# Patient Record
Sex: FEMALE | Race: WHITE | NOT HISPANIC OR LATINO | URBAN - METROPOLITAN AREA
[De-identification: names, ages, dates, MRNs, and addresses within clinical notes are randomized per-mention and may not be internally consistent; named-entity substitution may affect disease eponyms.]

---

## 2018-01-02 NOTE — PATIENT DISCUSSION
Astigmatism Counseling: The diagnosis of astigmatism was explained to the patient. Options for the correction of the patient's astigmatism which may include glasses, contacts or elective refractive surgery were discussed. Return for follow-up as scheduled.

## 2021-12-13 ENCOUNTER — NEW PATIENT (OUTPATIENT)
Dept: URBAN - METROPOLITAN AREA CLINIC 17 | Facility: CLINIC | Age: 66
End: 2021-12-13

## 2021-12-13 DIAGNOSIS — H25.13: ICD-10-CM

## 2021-12-13 PROCEDURE — 92004 COMPRE OPH EXAM NEW PT 1/>: CPT

## 2021-12-13 PROCEDURE — 92015 DETERMINE REFRACTIVE STATE: CPT

## 2021-12-13 ASSESSMENT — TONOMETRY
OD_IOP_MMHG: 17
OS_IOP_MMHG: 17

## 2021-12-13 ASSESSMENT — VISUAL ACUITY
OD_CC: 20/30+1
OS_CC: 20/25

## 2022-07-06 RX ORDER — DULOXETIN HYDROCHLORIDE 30 MG/1
1 CAPSULE, DELAYED RELEASE ORAL
COMMUNITY
End: 2022-08-09

## 2022-07-06 RX ORDER — PRAVASTATIN SODIUM 40 MG
TABLET ORAL
COMMUNITY

## 2022-07-06 RX ORDER — FLUTICASONE PROPIONATE 50 MCG
SPRAY, SUSPENSION (ML) NASAL
COMMUNITY

## 2022-07-06 RX ORDER — LEVOTHYROXINE SODIUM 88 UG/1
TABLET ORAL
COMMUNITY

## 2022-08-16 PROBLEM — E03.9 HYPOTHYROID: Status: ACTIVE | Noted: 2022-08-16

## 2022-08-16 PROBLEM — E55.9 VITAMIN D DEFICIENCY: Status: ACTIVE | Noted: 2022-08-16

## 2022-08-16 PROBLEM — I05.9 MITRAL VALVE DISEASE: Status: ACTIVE | Noted: 2017-03-28

## 2022-08-16 PROBLEM — G25.81 RESTLESS LEGS SYNDROME: Status: ACTIVE | Noted: 2022-08-16

## 2022-08-16 PROBLEM — M81.0 POSTMENOPAUSAL BONE LOSS: Status: ACTIVE | Noted: 2022-08-16

## 2022-08-16 PROBLEM — E03.9 HYPOTHYROIDISM: Status: ACTIVE | Noted: 2022-08-16

## 2022-08-16 PROBLEM — E78.2 MIXED HYPERLIPIDEMIA: Status: ACTIVE | Noted: 2022-08-16

## 2022-08-16 PROBLEM — E78.5 HYPERLIPIDEMIA: Status: ACTIVE | Noted: 2022-08-16

## 2022-08-16 PROBLEM — G47.9 SLEEP DISORDER: Status: ACTIVE | Noted: 2022-08-16

## 2022-08-16 PROBLEM — F33.0 MILD EPISODE OF RECURRENT MAJOR DEPRESSIVE DISORDER (HCC): Status: ACTIVE | Noted: 2022-08-16

## 2022-08-16 PROBLEM — R20.2 TINGLING OF BOTH FEET: Status: ACTIVE | Noted: 2022-08-16

## 2022-08-16 PROBLEM — M21.969 KNEE DEFORMITY, ACQUIRED: Status: ACTIVE | Noted: 2022-08-16

## 2022-08-16 PROBLEM — L23.7 CONTACT DERMATITIS DUE TO POISON IVY: Status: ACTIVE | Noted: 2022-08-16

## 2022-08-16 PROBLEM — G47.33 MILD OBSTRUCTIVE SLEEP APNEA: Status: ACTIVE | Noted: 2022-08-16

## 2022-08-16 PROBLEM — J30.89 NON-SEASONAL ALLERGIC RHINITIS: Status: ACTIVE | Noted: 2022-08-16

## 2022-08-16 PROBLEM — F32.9 MAJOR DEPRESSIVE DISORDER, SINGLE EPISODE, UNSPECIFIED: Status: ACTIVE | Noted: 2022-08-16

## 2022-09-08 PROBLEM — D64.9 ANEMIA: Status: ACTIVE | Noted: 2022-09-08

## 2022-09-08 PROBLEM — Z12.31 BREAST CANCER SCREENING BY MAMMOGRAM: Status: ACTIVE | Noted: 2022-09-08

## 2022-09-08 PROBLEM — M54.50 LOW BACK PAIN: Status: ACTIVE | Noted: 2022-09-08

## 2022-09-08 PROBLEM — R82.71 BACTERIURIA: Status: ACTIVE | Noted: 2022-09-08

## 2022-09-08 PROBLEM — D64.9 ANEMIA: Status: RESOLVED | Noted: 2022-09-08 | Resolved: 2022-09-08

## 2022-09-08 PROBLEM — R21 RASH OF BACK: Status: ACTIVE | Noted: 2022-09-08

## 2022-09-08 PROBLEM — B00.9 HSV-1 INFECTION: Status: ACTIVE | Noted: 2022-09-08

## 2022-12-21 ENCOUNTER — ESTABLISHED PATIENT (OUTPATIENT)
Dept: URBAN - METROPOLITAN AREA CLINIC 17 | Facility: CLINIC | Age: 67
End: 2022-12-21

## 2022-12-21 PROCEDURE — 99214 OFFICE O/P EST MOD 30 MIN: CPT

## 2022-12-21 PROCEDURE — 92015 DETERMINE REFRACTIVE STATE: CPT

## 2022-12-21 ASSESSMENT — VISUAL ACUITY
OS_CC: 20/30
OD_CC: 20/30-2

## 2022-12-21 ASSESSMENT — TONOMETRY
OS_IOP_MMHG: 14
OD_IOP_MMHG: 15

## 2023-05-24 ENCOUNTER — ESTABLISHED PATIENT (OUTPATIENT)
Dept: URBAN - METROPOLITAN AREA CLINIC 17 | Facility: CLINIC | Age: 68
End: 2023-05-24

## 2023-05-24 DIAGNOSIS — H43.812: ICD-10-CM

## 2023-05-24 DIAGNOSIS — H25.13: ICD-10-CM

## 2023-05-24 PROCEDURE — 92136 OPHTHALMIC BIOMETRY: CPT

## 2023-05-24 PROCEDURE — 99214 OFFICE O/P EST MOD 30 MIN: CPT

## 2023-05-24 ASSESSMENT — TONOMETRY
OS_IOP_MMHG: 17
OD_IOP_MMHG: 17

## 2023-05-24 ASSESSMENT — VISUAL ACUITY
OS_CC: 20/50
OD_CC: 20/70

## 2023-06-02 ENCOUNTER — POST-OP (OUTPATIENT)
Dept: URBAN - METROPOLITAN AREA CLINIC 17 | Facility: CLINIC | Age: 68
End: 2023-06-02

## 2023-06-02 DIAGNOSIS — Z96.1: ICD-10-CM

## 2023-06-02 PROCEDURE — 99024 POSTOP FOLLOW-UP VISIT: CPT

## 2023-06-02 ASSESSMENT — TONOMETRY
OS_IOP_MMHG: 20
OD_IOP_MMHG: 24
OD_IOP_MMHG: 21
OD_IOP_MMHG: 19

## 2023-06-02 ASSESSMENT — VISUAL ACUITY
OD_PH: 20/30
OD_SC: J16
OD_SC: 20/70

## 2023-06-07 ENCOUNTER — POST OP/EVAL OF SECOND EYE (OUTPATIENT)
Dept: URBAN - METROPOLITAN AREA CLINIC 17 | Facility: CLINIC | Age: 68
End: 2023-06-07

## 2023-06-07 DIAGNOSIS — H25.12: ICD-10-CM

## 2023-06-07 DIAGNOSIS — Z96.1: ICD-10-CM

## 2023-06-07 PROCEDURE — 99024 POSTOP FOLLOW-UP VISIT: CPT

## 2023-06-07 PROCEDURE — 92136 OPHTHALMIC BIOMETRY: CPT

## 2023-06-07 ASSESSMENT — VISUAL ACUITY
OD_SC: 20/50
OS_CC: 20/50
OD_SC: J16

## 2023-06-07 ASSESSMENT — TONOMETRY
OD_IOP_MMHG: 20
OS_IOP_MMHG: 17

## 2023-06-30 ENCOUNTER — POST-OP (OUTPATIENT)
Dept: URBAN - METROPOLITAN AREA CLINIC 17 | Facility: CLINIC | Age: 68
End: 2023-06-30

## 2023-06-30 DIAGNOSIS — Z96.1: ICD-10-CM

## 2023-06-30 PROCEDURE — 99024 POSTOP FOLLOW-UP VISIT: CPT

## 2023-06-30 ASSESSMENT — VISUAL ACUITY
OS_SC: J5
OS_SC: 20/30-1

## 2023-06-30 ASSESSMENT — TONOMETRY: OS_IOP_MMHG: 20

## 2023-07-10 ENCOUNTER — POST-OP (OUTPATIENT)
Dept: URBAN - METROPOLITAN AREA CLINIC 17 | Facility: CLINIC | Age: 68
End: 2023-07-10

## 2023-07-10 DIAGNOSIS — Z96.1: ICD-10-CM

## 2023-07-10 DIAGNOSIS — H04.123: ICD-10-CM

## 2023-07-10 PROCEDURE — 99024 POSTOP FOLLOW-UP VISIT: CPT

## 2023-07-10 RX ORDER — LIFITEGRAST 50 MG/ML: 1 SOLUTION/ DROPS OPHTHALMIC TWICE A DAY

## 2023-07-10 ASSESSMENT — KERATOMETRY
OD_K2POWER_DIOPTERS: 42.25
OD_AXISANGLE2_DEGREES: 50
OS_K1POWER_DIOPTERS: 41.75
OS_K2POWER_DIOPTERS: 42.50
OD_K1POWER_DIOPTERS: 41.25
OS_AXISANGLE_DEGREES: 35
OS_AXISANGLE2_DEGREES: 125
OD_AXISANGLE_DEGREES: 140

## 2023-07-10 ASSESSMENT — TONOMETRY
OD_IOP_MMHG: 14
OS_IOP_MMHG: 20

## 2023-07-10 ASSESSMENT — VISUAL ACUITY
OD_SC: 20/60
OS_CC: J7
OD_CC: J16
OS_SC: 20/30+2

## 2023-08-08 ENCOUNTER — FOLLOW UP (OUTPATIENT)
Dept: URBAN - METROPOLITAN AREA CLINIC 17 | Facility: CLINIC | Age: 68
End: 2023-08-08

## 2023-08-08 DIAGNOSIS — H04.123: ICD-10-CM

## 2023-08-08 PROCEDURE — 99213 OFFICE O/P EST LOW 20 MIN: CPT

## 2023-08-08 ASSESSMENT — TONOMETRY
OD_IOP_MMHG: 15
OS_IOP_MMHG: 14

## 2023-08-08 ASSESSMENT — VISUAL ACUITY
OD_SC: 20/40
OS_SC: 20/25-2

## 2024-03-28 ENCOUNTER — ESTABLISHED PATIENT (OUTPATIENT)
Dept: URBAN - METROPOLITAN AREA CLINIC 17 | Facility: CLINIC | Age: 69
End: 2024-03-28

## 2024-03-28 DIAGNOSIS — H04.123: ICD-10-CM

## 2024-03-28 DIAGNOSIS — H26.493: ICD-10-CM

## 2024-03-28 DIAGNOSIS — H43.812: ICD-10-CM

## 2024-03-28 PROCEDURE — 99214 OFFICE O/P EST MOD 30 MIN: CPT

## 2024-03-28 PROCEDURE — 92134 CPTRZ OPH DX IMG PST SGM RTA: CPT

## 2024-03-28 ASSESSMENT — VISUAL ACUITY
OU_SC: J4
OU_SC: 20/25
OS_SC: 20/25
OD_SC: 20/40

## 2024-03-28 ASSESSMENT — TONOMETRY
OD_IOP_MMHG: 14
OS_IOP_MMHG: 14

## 2024-06-12 ENCOUNTER — ESTABLISHED PATIENT (OUTPATIENT)
Dept: URBAN - METROPOLITAN AREA CLINIC 17 | Facility: CLINIC | Age: 69
End: 2024-06-12

## 2024-06-12 DIAGNOSIS — H04.123: ICD-10-CM

## 2024-06-12 DIAGNOSIS — H26.493: ICD-10-CM

## 2024-06-12 PROCEDURE — 92015 DETERMINE REFRACTIVE STATE: CPT

## 2024-06-12 PROCEDURE — 92014 COMPRE OPH EXAM EST PT 1/>: CPT

## 2024-06-12 ASSESSMENT — VISUAL ACUITY
OS_SC: 20/30
OD_SC: 20/50

## 2024-06-12 ASSESSMENT — TONOMETRY
OD_IOP_MMHG: 14
OS_IOP_MMHG: 14

## 2025-02-11 ENCOUNTER — COMPREHENSIVE EXAM (OUTPATIENT)
Age: 70
End: 2025-02-11

## 2025-02-11 DIAGNOSIS — H04.123: ICD-10-CM

## 2025-02-11 DIAGNOSIS — H26.493: ICD-10-CM

## 2025-02-11 PROCEDURE — 92014 COMPRE OPH EXAM EST PT 1/>: CPT
